# Patient Record
Sex: FEMALE | Race: WHITE | NOT HISPANIC OR LATINO | Employment: FULL TIME | URBAN - METROPOLITAN AREA
[De-identification: names, ages, dates, MRNs, and addresses within clinical notes are randomized per-mention and may not be internally consistent; named-entity substitution may affect disease eponyms.]

---

## 2024-02-12 ENCOUNTER — HOSPITAL ENCOUNTER (OUTPATIENT)
Dept: RADIOLOGY | Facility: CLINIC | Age: 64
End: 2024-02-12
Payer: MEDICARE

## 2024-02-12 ENCOUNTER — OFFICE VISIT (OUTPATIENT)
Dept: SURGERY | Facility: CLINIC | Age: 64
End: 2024-02-12
Payer: MEDICARE

## 2024-02-12 VITALS
RESPIRATION RATE: 17 BRPM | TEMPERATURE: 97.7 F | BODY MASS INDEX: 26.92 KG/M2 | WEIGHT: 177.6 LBS | HEART RATE: 66 BPM | DIASTOLIC BLOOD PRESSURE: 74 MMHG | SYSTOLIC BLOOD PRESSURE: 127 MMHG | HEIGHT: 68 IN

## 2024-02-12 DIAGNOSIS — L72.3 SEBACEOUS CYST: Primary | ICD-10-CM

## 2024-02-12 PROCEDURE — 99212 OFFICE O/P EST SF 10 MIN: CPT | Performed by: SURGERY

## 2024-02-12 PROCEDURE — 1036F TOBACCO NON-USER: CPT | Performed by: SURGERY

## 2024-02-12 PROCEDURE — 99202 OFFICE O/P NEW SF 15 MIN: CPT | Performed by: SURGERY

## 2024-02-12 ASSESSMENT — ENCOUNTER SYMPTOMS
MUSCULOSKELETAL NEGATIVE: 1
GASTROINTESTINAL NEGATIVE: 1
EYES NEGATIVE: 1
DEPRESSION: 0
CARDIOVASCULAR NEGATIVE: 1
OCCASIONAL FEELINGS OF UNSTEADINESS: 0
PSYCHIATRIC NEGATIVE: 1
RESPIRATORY NEGATIVE: 1
NEUROLOGICAL NEGATIVE: 1
LOSS OF SENSATION IN FEET: 0
HEMATOLOGIC/LYMPHATIC NEGATIVE: 1
CONSTITUTIONAL NEGATIVE: 1

## 2024-02-12 ASSESSMENT — PATIENT HEALTH QUESTIONNAIRE - PHQ9
SUM OF ALL RESPONSES TO PHQ9 QUESTIONS 1 & 2: 0
1. LITTLE INTEREST OR PLEASURE IN DOING THINGS: NOT AT ALL
2. FEELING DOWN, DEPRESSED OR HOPELESS: NOT AT ALL

## 2024-02-12 ASSESSMENT — COLUMBIA-SUICIDE SEVERITY RATING SCALE - C-SSRS
6. HAVE YOU EVER DONE ANYTHING, STARTED TO DO ANYTHING, OR PREPARED TO DO ANYTHING TO END YOUR LIFE?: NO
2. HAVE YOU ACTUALLY HAD ANY THOUGHTS OF KILLING YOURSELF?: NO
1. IN THE PAST MONTH, HAVE YOU WISHED YOU WERE DEAD OR WISHED YOU COULD GO TO SLEEP AND NOT WAKE UP?: NO

## 2024-02-12 ASSESSMENT — LIFESTYLE VARIABLES
HOW MANY STANDARD DRINKS CONTAINING ALCOHOL DO YOU HAVE ON A TYPICAL DAY: 1 OR 2
HOW OFTEN DO YOU HAVE A DRINK CONTAINING ALCOHOL: 2-3 TIMES A WEEK
SKIP TO QUESTIONS 9-10: 1
AUDIT-C TOTAL SCORE: 3
HOW OFTEN DO YOU HAVE SIX OR MORE DRINKS ON ONE OCCASION: NEVER

## 2024-02-12 ASSESSMENT — PAIN SCALES - GENERAL: PAINLEVEL: 0-NO PAIN

## 2024-02-12 NOTE — PROGRESS NOTES
General Surgery Service    History Of Present Illness    Kia Amaya is a 63 y.o. female presenting with epidermal inclusion cyst to the upper middle back. This has previously drained but did not resolve. The area is painful as it lies beneath her bra strap along her back.    No use of blood thinners or anti-platelet agents     Past Medical History  She has no past medical history on file.    No current outpatient medications on file prior to visit.     No current facility-administered medications on file prior to visit.       Surgical History  She has a past surgical history that includes  section, classic (10/13/2015); Laparoscopy diagnostic / biopsy / aspiration / lysis (10/13/2015); Total hip arthroplasty (10/13/2015); MR angio head wo IV contrast (2012); Exploratory laparotomy (); and  section, low transverse ( and ).     Social History  She reports that she quit smoking about 2 years ago. Her smoking use included cigarettes. She has a 10.00 pack-year smoking history. She has never used smokeless tobacco. She reports current alcohol use of about 2.0 standard drinks of alcohol per week. She reports that she does not use drugs.    Family History  Family History   Problem Relation Name Age of Onset    Atrial fibrillation Mother Char Juarez     Atrial fibrillation Father Nazerino Labbiente     Cancer Father Nazerino Labbiente     Heart disease Father Nazerino Labbiente     Stroke Father Nazerino Labbiente         Allergies  Sulfa (sulfonamide antibiotics)    Review of Systems   Constitutional: Negative.    HENT: Negative.     Eyes: Negative.    Respiratory: Negative.     Cardiovascular: Negative.    Gastrointestinal: Negative.    Genitourinary: Negative.    Musculoskeletal: Negative.    Skin: Negative.    Neurological: Negative.    Hematological: Negative.    Psychiatric/Behavioral: Negative.          Physical Exam  Constitutional:       Appearance: Normal appearance.  "  HENT:      Head: Normocephalic.      Nose: Nose normal.      Mouth/Throat:      Mouth: Mucous membranes are dry.   Eyes:      Extraocular Movements: Extraocular movements intact.      Pupils: Pupils are equal, round, and reactive to light.   Cardiovascular:      Rate and Rhythm: Normal rate.      Pulses: Normal pulses.   Pulmonary:      Effort: Pulmonary effort is normal.   Abdominal:      General: Abdomen is flat.      Palpations: Abdomen is soft.   Musculoskeletal:         General: Normal range of motion.      Cervical back: Normal range of motion.   Skin:     General: Skin is warm and dry.      Comments: 3 x 1.5 cm soft tissue lesion of the upper, middle back   Neurological:      Mental Status: She is alert and oriented to person, place, and time.   Psychiatric:         Mood and Affect: Mood normal.         Behavior: Behavior normal.          Last Recorded Vitals  /74   Pulse 66   Temp 36.5 °C (97.7 °F)   Resp 17   Wt 80.6 kg (177 lb 9.6 oz)     Relevant Results      No results found for this or any previous visit (from the past 24 hour(s)).   No results found for: \"HGBA1C\"   No results found.    Active Problems:  There are no active Hospital Problems.     Assessment/Plan   Diagnoses and all orders for this visit:  Sebaceous cyst    Plan for removal of the cyst in the [facility].         Courtney Santamaria MD   "

## 2024-02-20 PROBLEM — M25.552 LEFT HIP PAIN: Status: ACTIVE | Noted: 2019-07-03

## 2024-02-20 PROBLEM — H53.8 BLURRED VISION, LEFT EYE: Status: ACTIVE | Noted: 2024-02-20

## 2024-02-20 PROBLEM — E55.9 VITAMIN D DEFICIENCY: Status: ACTIVE | Noted: 2024-01-31

## 2024-02-20 PROBLEM — D32.9 MENINGIOMA (MULTI): Status: ACTIVE | Noted: 2024-02-20

## 2024-02-20 PROBLEM — F41.9 ANXIETY DISORDER: Status: ACTIVE | Noted: 2024-02-20

## 2024-02-20 PROBLEM — M25.519 SHOULDER PAIN: Status: ACTIVE | Noted: 2024-02-20

## 2024-02-20 PROBLEM — E78.49 OTHER HYPERLIPIDEMIA: Status: ACTIVE | Noted: 2024-01-31

## 2024-02-20 PROBLEM — G25.81 RESTLESS LEG SYNDROME: Status: ACTIVE | Noted: 2018-11-27

## 2024-02-20 PROBLEM — G43.909 MIGRAINE: Status: ACTIVE | Noted: 2024-02-20

## 2024-02-20 PROBLEM — R51.9 HEADACHE: Status: ACTIVE | Noted: 2024-02-20

## 2024-02-20 PROBLEM — T84.021A: Status: ACTIVE | Noted: 2024-02-20

## 2024-02-20 PROBLEM — D64.9 ANEMIA: Status: ACTIVE | Noted: 2024-02-20

## 2024-02-20 PROBLEM — J01.41 ACUTE RECURRENT PANSINUSITIS: Status: ACTIVE | Noted: 2018-12-27

## 2024-02-20 RX ORDER — CITALOPRAM 40 MG/1
1 TABLET, FILM COATED ORAL DAILY
COMMUNITY
End: 2024-03-08 | Stop reason: ALTCHOICE

## 2024-02-20 RX ORDER — NORTRIPTYLINE HYDROCHLORIDE 10 MG/1
10 CAPSULE ORAL
COMMUNITY
Start: 2022-09-14 | End: 2024-03-08 | Stop reason: ALTCHOICE

## 2024-02-20 RX ORDER — ALBUTEROL SULFATE 90 UG/1
AEROSOL, METERED RESPIRATORY (INHALATION)
COMMUNITY
Start: 2018-11-21 | End: 2024-03-08 | Stop reason: ALTCHOICE

## 2024-02-20 RX ORDER — TRAZODONE HYDROCHLORIDE 100 MG/1
100 TABLET ORAL
COMMUNITY
Start: 2018-12-03 | End: 2024-03-08 | Stop reason: ALTCHOICE

## 2024-02-20 RX ORDER — QUETIAPINE FUMARATE 200 MG/1
200 TABLET, FILM COATED ORAL
COMMUNITY
Start: 2018-12-03 | End: 2024-03-08 | Stop reason: ALTCHOICE

## 2024-02-20 RX ORDER — SERTRALINE HYDROCHLORIDE 100 MG/1
100 TABLET, FILM COATED ORAL
COMMUNITY
Start: 2018-12-03 | End: 2024-03-08 | Stop reason: ALTCHOICE

## 2024-02-20 RX ORDER — PRAMIPEXOLE DIHYDROCHLORIDE 0.12 MG/1
0.12 TABLET ORAL
COMMUNITY
End: 2024-03-08 | Stop reason: ALTCHOICE

## 2024-02-20 RX ORDER — DOXYCYCLINE HYCLATE 100 MG
100 TABLET ORAL
COMMUNITY
Start: 2024-01-29 | End: 2024-03-08 | Stop reason: ALTCHOICE

## 2024-02-23 ENCOUNTER — APPOINTMENT (OUTPATIENT)
Dept: SURGERY | Facility: CLINIC | Age: 64
End: 2024-02-23
Payer: MEDICARE

## 2024-02-28 ENCOUNTER — TELEPHONE (OUTPATIENT)
Dept: SURGERY | Facility: CLINIC | Age: 64
End: 2024-02-28
Payer: MEDICARE

## 2024-02-28 NOTE — TELEPHONE ENCOUNTER
Checklist performed off of 2/12/24 OV with Dr. Santamaria.    Verified appointment date/time YES  Is appropriate time allocated for procedure? YES  Verified diagnosis/procedure/laterality? YES, epidermal cyst to the upper middle back  Verified insurance with patient, YES    Reviewed medications:YES  Is the patient taking anticoagulants, if so which one? NO      Allergies: Yes, sulfa    Imaging/or testing pertaining to current diagnosis?  No    Recent visits with providers/ER/urgent care for this diagnosis?  Yes, with Dr. Santamaria on 2/12/24.    Reviewed pre-procedural medication instructions with patient.  Pt verbalized understanding and denies further questions.

## 2024-03-08 ENCOUNTER — PROCEDURE VISIT (OUTPATIENT)
Dept: SURGERY | Facility: CLINIC | Age: 64
End: 2024-03-08
Payer: MEDICARE

## 2024-03-08 VITALS
OXYGEN SATURATION: 98 % | BODY MASS INDEX: 26.37 KG/M2 | TEMPERATURE: 98 F | HEART RATE: 70 BPM | SYSTOLIC BLOOD PRESSURE: 110 MMHG | DIASTOLIC BLOOD PRESSURE: 72 MMHG | WEIGHT: 174 LBS | HEIGHT: 68 IN

## 2024-03-08 DIAGNOSIS — L72.9 SKIN CYST: Primary | ICD-10-CM

## 2024-03-08 PROCEDURE — 2500000004 HC RX 250 GENERAL PHARMACY W/ HCPCS (ALT 636 FOR OP/ED): Performed by: SURGERY

## 2024-03-08 PROCEDURE — 88304 TISSUE EXAM BY PATHOLOGIST: CPT | Mod: TC | Performed by: SURGERY

## 2024-03-08 PROCEDURE — 2500000005 HC RX 250 GENERAL PHARMACY W/O HCPCS: Performed by: SURGERY

## 2024-03-08 PROCEDURE — 96372 THER/PROPH/DIAG INJ SC/IM: CPT | Performed by: SURGERY

## 2024-03-08 PROCEDURE — 88304 TISSUE EXAM BY PATHOLOGIST: CPT | Performed by: PATHOLOGY

## 2024-03-08 RX ORDER — LIDOCAINE HYDROCHLORIDE AND EPINEPHRINE 5; 5 MG/ML; UG/ML
18 INJECTION, SOLUTION INFILTRATION; PERINEURAL ONCE
Status: COMPLETED | OUTPATIENT
Start: 2024-03-08 | End: 2024-03-08

## 2024-03-08 RX ORDER — INDOMETHACIN 25 MG/1
2 CAPSULE ORAL ONCE
Status: COMPLETED | OUTPATIENT
Start: 2024-03-08 | End: 2024-03-08

## 2024-03-08 RX ADMIN — LIDOCAINE HYDROCHLORIDE,EPINEPHRINE BITARTRATE 18 ML: 5; .005 INJECTION, SOLUTION INFILTRATION; PERINEURAL at 14:27

## 2024-03-08 RX ADMIN — SODIUM BICARBONATE 2 MEQ: 84 INJECTION, SOLUTION INTRAVENOUS at 14:27

## 2024-03-08 ASSESSMENT — ENCOUNTER SYMPTOMS
RESPIRATORY NEGATIVE: 1
ENDOCRINE NEGATIVE: 1
CONSTITUTIONAL NEGATIVE: 1
HEMATOLOGIC/LYMPHATIC NEGATIVE: 1
MUSCULOSKELETAL NEGATIVE: 1
PSYCHIATRIC NEGATIVE: 1
NEUROLOGICAL NEGATIVE: 1
CARDIOVASCULAR NEGATIVE: 1
GASTROINTESTINAL NEGATIVE: 1
EYES NEGATIVE: 1

## 2024-03-08 ASSESSMENT — PATIENT HEALTH QUESTIONNAIRE - PHQ9
2. FEELING DOWN, DEPRESSED OR HOPELESS: NOT AT ALL
SUM OF ALL RESPONSES TO PHQ9 QUESTIONS 1 AND 2: 0
1. LITTLE INTEREST OR PLEASURE IN DOING THINGS: NOT AT ALL

## 2024-03-08 ASSESSMENT — PAIN SCALES - GENERAL
PAINLEVEL_OUTOF10: 0 - NO PAIN
PAINLEVEL: 0-NO PAIN

## 2024-03-08 NOTE — PROGRESS NOTES
Subjective   Patient ID: Kia Amaya is a 63 y.o. female who presents for Procedure (Pt here for a sebaceous cyst excision.  Pt states that she has had this for a couple years, but it has gotten bigger and is bothersome.).  HPI  Patient presents for removal of a cyst from the upper middle back. This area becomes infected and is painful.     Review of Systems   Constitutional: Negative.    HENT: Negative.     Eyes: Negative.    Respiratory: Negative.     Cardiovascular: Negative.    Gastrointestinal: Negative.    Endocrine: Negative.    Genitourinary: Negative.    Musculoskeletal: Negative.    Skin: Negative.    Neurological: Negative.    Hematological: Negative.    Psychiatric/Behavioral: Negative.         Objective   Physical Exam  Constitutional:       Appearance: Normal appearance.   HENT:      Head: Normocephalic.      Mouth/Throat:      Mouth: Mucous membranes are dry.   Eyes:      Extraocular Movements: Extraocular movements intact.      Pupils: Pupils are equal, round, and reactive to light.   Cardiovascular:      Rate and Rhythm: Normal rate.      Pulses: Normal pulses.   Pulmonary:      Effort: Pulmonary effort is normal.   Abdominal:      General: Abdomen is flat.      Palpations: Abdomen is soft.   Musculoskeletal:         General: Normal range of motion.      Cervical back: Normal range of motion.   Skin:     General: Skin is warm and dry.      Comments: 3 x 3.2 cm soft tissue mass to the upper back   Neurological:      Mental Status: She is alert and oriented to person, place, and time.   Psychiatric:         Mood and Affect: Mood normal.         Behavior: Behavior normal.         Assessment/Plan   Diagnoses and all orders for this visit:  Skin cyst  -     Surgical Pathology Exam    Cyst removed, glue applied. Follow up prn       Consent was obtained    Pre Op Diagnosis: Soft Tissue Lesion, 3 x 3.2 cm to the upper middle back  Post Op Diagnosis: Same  Procedure: Soft Tissue Lesion Excision, 3 x 3.2  cm to the upper middle back  Surgeon: Courtney Santamaria  EBL: 3 cc  Specimen: back cyst  Anesthesia: Buffered 1% lidocaine with epinephrine  Findings: back cyst  Procedure: The area was prepped and draped in the standard, sterile fashion. Local was injected into the soft tissue mass until the area was numb. A 15 blade scalpel was used to sharply excise skin, subcutaneous tissue, and fat with the lesion. The wound was irrigated. The deep tissue and skin was closed with 4.0 Monocryl. Glue was applied.     Courtney Santamaria MD 03/08/24 10:53 AM

## 2024-03-12 LAB
LABORATORY COMMENT REPORT: NORMAL
PATH REPORT.FINAL DX SPEC: NORMAL
PATH REPORT.GROSS SPEC: NORMAL
PATH REPORT.RELEVANT HX SPEC: NORMAL
PATH REPORT.TOTAL CANCER: NORMAL

## 2024-09-10 DIAGNOSIS — Z12.31 ENCOUNTER FOR SCREENING MAMMOGRAM FOR BREAST CANCER: ICD-10-CM

## 2025-08-28 ENCOUNTER — APPOINTMENT (OUTPATIENT)
Dept: ORTHOPEDIC SURGERY | Facility: CLINIC | Age: 65
End: 2025-08-28
Payer: MEDICARE